# Patient Record
Sex: MALE | Race: WHITE | NOT HISPANIC OR LATINO | URBAN - METROPOLITAN AREA
[De-identification: names, ages, dates, MRNs, and addresses within clinical notes are randomized per-mention and may not be internally consistent; named-entity substitution may affect disease eponyms.]

---

## 2023-05-17 ENCOUNTER — OFFICE VISIT (OUTPATIENT)
Dept: PRIMARY CARE | Facility: CLINIC | Age: 58
End: 2023-05-17
Payer: COMMERCIAL

## 2023-05-17 VITALS
HEART RATE: 83 BPM | SYSTOLIC BLOOD PRESSURE: 110 MMHG | TEMPERATURE: 96.8 F | HEIGHT: 72 IN | OXYGEN SATURATION: 98 % | DIASTOLIC BLOOD PRESSURE: 83 MMHG | WEIGHT: 283 LBS | BODY MASS INDEX: 38.33 KG/M2

## 2023-05-17 DIAGNOSIS — B35.6 TINEA CRURIS: Primary | ICD-10-CM

## 2023-05-17 PROCEDURE — 1036F TOBACCO NON-USER: CPT | Performed by: FAMILY MEDICINE

## 2023-05-17 PROCEDURE — 99213 OFFICE O/P EST LOW 20 MIN: CPT | Performed by: FAMILY MEDICINE

## 2023-05-17 RX ORDER — ATORVASTATIN CALCIUM 20 MG/1
20 TABLET, FILM COATED ORAL DAILY
COMMUNITY
End: 2023-06-13 | Stop reason: SDUPTHER

## 2023-05-17 RX ORDER — FLUCONAZOLE 100 MG/1
100 TABLET ORAL DAILY
Qty: 15 TABLET | Refills: 0 | Status: SHIPPED | OUTPATIENT
Start: 2023-05-17 | End: 2023-06-01

## 2023-05-17 RX ORDER — FUROSEMIDE 20 MG/1
20 TABLET ORAL
COMMUNITY
Start: 2023-05-12 | End: 2023-12-14 | Stop reason: ALTCHOICE

## 2023-05-17 RX ORDER — DOXYLAMINE SUCCINATE 25 MG
TABLET ORAL 2 TIMES DAILY
Qty: 92 G | Refills: 0 | Status: SHIPPED | OUTPATIENT
Start: 2023-05-17 | End: 2023-06-16

## 2023-05-17 RX ORDER — METOPROLOL SUCCINATE 50 MG/1
50 TABLET, EXTENDED RELEASE ORAL
COMMUNITY
Start: 2023-05-12 | End: 2023-12-14 | Stop reason: ALTCHOICE

## 2023-05-17 RX ORDER — ASPIRIN 81 MG/1
TABLET ORAL
COMMUNITY
Start: 2017-07-07 | End: 2023-12-14 | Stop reason: ALTCHOICE

## 2023-05-17 RX ORDER — APIXABAN 5 MG/1
5 TABLET, FILM COATED ORAL 2 TIMES DAILY
COMMUNITY
Start: 2023-05-12

## 2023-05-17 ASSESSMENT — LIFESTYLE VARIABLES
SKIP TO QUESTIONS 9-10: 1
HOW MANY STANDARD DRINKS CONTAINING ALCOHOL DO YOU HAVE ON A TYPICAL DAY: 1 OR 2
HOW OFTEN DO YOU HAVE SIX OR MORE DRINKS ON ONE OCCASION: NEVER
AUDIT-C TOTAL SCORE: 1
HOW OFTEN DO YOU HAVE A DRINK CONTAINING ALCOHOL: MONTHLY OR LESS

## 2023-05-17 NOTE — PROGRESS NOTES
Subjective   Patient ID: Mu Horan is a 57 y.o. male who presents for Rash.    Assessment/Plan     Problem List Items Addressed This Visit    None  Visit Diagnoses       Tinea cruris    -  Primary    Relevant Medications    fluconazole (Diflucan) 100 mg tablet    miconazole (Micatin) 2 % cream          Tinea cruris consider above medication  Call us if symptoms are not improving  Patient agrees  HPI  57-year-old male complaining of skin rash with itching going under abdominal folds and groin region  Applying over-the-counter medication without much help no pain no discharge but itching is present  No Known Allergies    Current Outpatient Medications   Medication Sig Dispense Refill    aspirin 81 mg EC tablet Take by mouth.      atorvastatin (Lipitor) 20 mg tablet Take 1 tablet (20 mg) by mouth once daily.      Eliquis 5 mg tablet Take 1 tablet (5 mg) by mouth 2 times a day.      fluconazole (Diflucan) 100 mg tablet Take 1 tablet (100 mg) by mouth once daily for 15 days. 15 tablet 0    Lasix 20 mg tablet 1 tablet (20 mg).      miconazole (Micatin) 2 % cream Apply topically 2 times a day. 92 g 0    Toprol XL 50 mg 24 hr tablet 1 tablet (50 mg).       No current facility-administered medications for this visit.       Objective   Visit Vitals  /83 (BP Location: Left arm, Patient Position: Sitting)   Pulse 83   Temp 36 °C (96.8 °F)   Ht 1.829 m (6')   Wt 128 kg (283 lb)   SpO2 98%   BMI 38.38 kg/m²   Smoking Status Never   BSA 2.55 m²     Physical Exam  Constitutional:       General: He is not in acute distress.     Appearance: Normal appearance.   HENT:      Head: Normocephalic and atraumatic.      Nose: Nose normal.   Eyes:      Extraocular Movements: Extraocular movements intact.      Conjunctiva/sclera: Conjunctivae normal.   Cardiovascular:      Rate and Rhythm: Normal rate and regular rhythm.   Pulmonary:      Effort: Pulmonary effort is normal.      Breath sounds: Normal breath sounds.   Skin:      General: Skin is warm.   Neurological:      Mental Status: He is alert and oriented to person, place, and time.   Psychiatric:         Mood and Affect: Mood normal.         Behavior: Behavior normal.     Macular rash present in abdominal folds without tenderness with scaly skin's  Bilateral groin similar rash present  Immunization History   Administered Date(s) Administered    Nelia SARS-CoV-2 Vaccination 03/20/2021    Moderna SARS-CoV-2 Vaccination 12/21/2021       Review of Systems    No visits with results within 4 Month(s) from this visit.   Latest known visit with results is:   Legacy Encounter on 07/13/2021   Component Date Value Ref Range Status    TSH 07/13/2021 2.68  0.44 - 3.98 mIU/L Final    Color, Urine 07/13/2021 BERNARD  STRAW,YELLOW Final    Appearance, Urine 07/13/2021 HAZY  CLEAR Final    Specific Gravity, Urine 07/13/2021 1.020  1.005 - 1.035 Final    pH, Urine 07/13/2021 5.0  5.0 - 8.0 Final    Protein, Urine 07/13/2021 NEGATIVE  NEGATIVE mg/dL Final    Glucose, Urine 07/13/2021 NEGATIVE  NEGATIVE mg/dL Final    Blood, Urine 07/13/2021 NEGATIVE  NEGATIVE Final    Ketones, Urine 07/13/2021 5 (TRACE) (A)  NEGATIVE mg/dL Final    Bilirubin, Urine 07/13/2021 NEGATIVE  NEGATIVE Final    Urobilinogen, Urine 07/13/2021 2.0 (H)  0.0 - 1.9 mg/dL Final    Nitrite, Urine 07/13/2021 NEGATIVE  NEGATIVE Final    Leukocyte Esterase, Urine 07/13/2021 NEGATIVE  NEGATIVE Final    Glucose 07/13/2021 111 (H)  74 - 99 mg/dL Final    Sodium 07/13/2021 139  136 - 145 mmol/L Final    Potassium 07/13/2021 4.1  3.5 - 5.3 mmol/L Final    Chloride 07/13/2021 103  98 - 107 mmol/L Final    Bicarbonate 07/13/2021 26  21 - 32 mmol/L Final    Anion Gap 07/13/2021 14  10 - 20 mmol/L Final    Urea Nitrogen 07/13/2021 15  6 - 23 mg/dL Final    Creatinine 07/13/2021 0.70  0.50 - 1.30 mg/dL Final    GLOMERULAR FILTRATION RATE-NON AFR* 07/13/2021 >60  >60 mL/min/1.73m2 Final    GLOMERULAR FILTRATION RATE-* 07/13/2021 >60  >60  mL/min/1.73m2 Final    Calcium 07/13/2021 9.6  8.6 - 10.6 mg/dL Final    Albumin 07/13/2021 4.2  3.4 - 5.0 g/dL Final    Alkaline Phosphatase 07/13/2021 66  33 - 120 U/L Final    Total Protein 07/13/2021 6.8  6.4 - 8.2 g/dL Final    AST 07/13/2021 14  9 - 39 U/L Final    Total Bilirubin 07/13/2021 0.9  0.0 - 1.2 mg/dL Final    ALT (SGPT) 07/13/2021 21  10 - 52 U/L Final    Cholesterol 07/13/2021 169  0 - 199 mg/dL Final    HDL 07/13/2021 40.5  mg/dL Final    Cholesterol/HDL Ratio 07/13/2021 4.2   Final    LDL 07/13/2021 116 (H)  0 - 99 mg/dL Final    VLDL 07/13/2021 13  0 - 40 mg/dL Final    Triglycerides 07/13/2021 63  0 - 149 mg/dL Final    WBC 07/13/2021 5.9  4.4 - 11.3 x10E9/L Final    nRBC 07/13/2021 0.0  0.0 - 0.0 /100 WBC Final    RBC 07/13/2021 4.77  4.50 - 5.90 x10E12/L Final    Hemoglobin 07/13/2021 14.4  13.5 - 17.5 g/dL Final    Hematocrit 07/13/2021 45.5  41.0 - 52.0 % Final    MCV 07/13/2021 95  80 - 100 fL Final    MCHC 07/13/2021 31.6 (L)  32.0 - 36.0 g/dL Final    Platelets 07/13/2021 204  150 - 450 x10E9/L Final    RDW 07/13/2021 14.6 (H)  11.5 - 14.5 % Final    ALBUMIN (MG/L) IN URINE 07/13/2021 12.0  Not Established mg/L Final    Albumin/Creatine Ratio 07/13/2021 6.3  0.0 - 30.0 ug/mg crt Final    Creatinine, Urine 07/13/2021 190.0  20.0 - 370.0 mg/dL Final       Radiology: Reviewed imaging in powerchart.  No results found.    No family history on file.  Social History     Socioeconomic History    Marital status:      Spouse name: None    Number of children: None    Years of education: None    Highest education level: None   Occupational History    None   Tobacco Use    Smoking status: Never    Smokeless tobacco: Never   Vaping Use    Vaping status: None   Substance and Sexual Activity    Alcohol use: Not Currently    Drug use: Never    Sexual activity: None   Other Topics Concern    None   Social History Narrative    None     Social Determinants of Health     Financial Resource Strain:  Not on file   Food Insecurity: Not on file   Transportation Needs: Not on file   Physical Activity: Not on file   Stress: Not on file   Social Connections: Not on file   Intimate Partner Violence: Not on file   Housing Stability: Not on file     Past Medical History:   Diagnosis Date    Lumbago with sciatica, right side 11/20/2015    Right-sided low back pain with right-sided sciatica    Personal history of other diseases of the musculoskeletal system and connective tissue 11/16/2015    History of backache    Personal history of other specified conditions     History of vertigo     Past Surgical History:   Procedure Laterality Date    KNEE SURGERY  11/20/2015    Knee Surgery Right       Charting was completed using voice recognition technology and may include unintended errors.

## 2023-06-13 DIAGNOSIS — E78.5 HYPERLIPIDEMIA, UNSPECIFIED HYPERLIPIDEMIA TYPE: ICD-10-CM

## 2023-06-14 RX ORDER — ATORVASTATIN CALCIUM 20 MG/1
20 TABLET, FILM COATED ORAL DAILY
Qty: 90 TABLET | Refills: 2 | Status: SHIPPED | OUTPATIENT
Start: 2023-06-14 | End: 2024-03-12 | Stop reason: SDUPTHER

## 2023-09-27 PROBLEM — E66.1 CLASS 3 DRUG-INDUCED OBESITY WITH BODY MASS INDEX (BMI) OF 40.0 TO 44.9 IN ADULT (MULTI): Status: ACTIVE | Noted: 2023-09-27

## 2023-09-27 PROBLEM — M79.604 PAIN OF RIGHT LOWER EXTREMITY: Status: ACTIVE | Noted: 2023-09-27

## 2023-09-27 PROBLEM — B35.4 TINEA CORPORIS: Status: ACTIVE | Noted: 2023-09-27

## 2023-09-27 PROBLEM — R19.7 ACUTE DIARRHEA: Status: ACTIVE | Noted: 2023-09-27

## 2023-09-27 PROBLEM — M25.569 KNEE PAIN: Status: ACTIVE | Noted: 2023-09-27

## 2023-09-27 PROBLEM — E66.813 CLASS 3 DRUG-INDUCED OBESITY WITH BODY MASS INDEX (BMI) OF 40.0 TO 44.9 IN ADULT: Status: ACTIVE | Noted: 2023-09-27

## 2023-09-27 PROBLEM — I82.409 DEEP VEIN THROMBOSIS (MULTI): Status: ACTIVE | Noted: 2023-09-27

## 2023-09-27 PROBLEM — M17.11 LOCALIZED OSTEOARTHRITIS OF RIGHT KNEE: Status: ACTIVE | Noted: 2023-09-27

## 2023-09-27 PROBLEM — I10 BENIGN ESSENTIAL HYPERTENSION: Status: ACTIVE | Noted: 2023-09-27

## 2023-09-27 PROBLEM — J06.9 ACUTE UPPER RESPIRATORY INFECTION: Status: ACTIVE | Noted: 2023-09-27

## 2023-09-27 PROBLEM — I86.8 VARICOSE VEINS OF OTHER SPECIFIED SITES: Status: ACTIVE | Noted: 2023-09-27

## 2023-09-27 PROBLEM — R21 RASH: Status: ACTIVE | Noted: 2023-09-27

## 2023-09-27 PROBLEM — N20.0 CALCIUM NEPHROLITHIASIS: Status: ACTIVE | Noted: 2023-09-27

## 2023-09-27 PROBLEM — E55.9 VITAMIN D DEFICIENCY: Status: ACTIVE | Noted: 2023-09-27

## 2023-09-27 PROBLEM — E78.5 HYPERLIPIDEMIA: Status: ACTIVE | Noted: 2023-09-27

## 2023-09-27 PROBLEM — E11.9 CONTROLLED DIABETES MELLITUS (MULTI): Status: ACTIVE | Noted: 2023-09-27

## 2023-09-27 RX ORDER — METOPROLOL TARTRATE 25 MG/1
1 TABLET, FILM COATED ORAL 2 TIMES DAILY
COMMUNITY
Start: 2023-06-21

## 2023-09-27 RX ORDER — FLUCONAZOLE 100 MG/1
1 TABLET ORAL DAILY
COMMUNITY
Start: 2022-01-11 | End: 2023-12-14 | Stop reason: ALTCHOICE

## 2023-09-27 RX ORDER — AMIODARONE HYDROCHLORIDE 200 MG/1
200 TABLET ORAL DAILY
COMMUNITY
Start: 2023-05-30 | End: 2023-12-14 | Stop reason: ALTCHOICE

## 2023-09-27 RX ORDER — POTASSIUM CHLORIDE 20 MEQ/1
20 TABLET, EXTENDED RELEASE ORAL DAILY
COMMUNITY
Start: 2023-05-30 | End: 2023-12-14 | Stop reason: ALTCHOICE

## 2023-11-07 ENCOUNTER — APPOINTMENT (OUTPATIENT)
Dept: CARDIAC SURGERY | Facility: CLINIC | Age: 58
End: 2023-11-07
Payer: COMMERCIAL

## 2023-12-14 ENCOUNTER — OFFICE VISIT (OUTPATIENT)
Dept: PRIMARY CARE | Facility: CLINIC | Age: 58
End: 2023-12-14
Payer: COMMERCIAL

## 2023-12-14 VITALS
HEART RATE: 98 BPM | TEMPERATURE: 97.2 F | OXYGEN SATURATION: 99 % | SYSTOLIC BLOOD PRESSURE: 112 MMHG | HEIGHT: 72 IN | DIASTOLIC BLOOD PRESSURE: 70 MMHG | BODY MASS INDEX: 39.82 KG/M2 | WEIGHT: 294 LBS

## 2023-12-14 DIAGNOSIS — Z00.00 VISIT FOR PREVENTIVE HEALTH EXAMINATION: Primary | ICD-10-CM

## 2023-12-14 DIAGNOSIS — Z12.11 SCREENING FOR MALIGNANT NEOPLASM OF COLON: ICD-10-CM

## 2023-12-14 DIAGNOSIS — E11.9 CONTROLLED TYPE 2 DIABETES MELLITUS WITHOUT COMPLICATION, WITHOUT LONG-TERM CURRENT USE OF INSULIN (MULTI): ICD-10-CM

## 2023-12-14 DIAGNOSIS — I82.409 DEEP VEIN THROMBOSIS (DVT) OF LOWER EXTREMITY, UNSPECIFIED CHRONICITY, UNSPECIFIED LATERALITY, UNSPECIFIED VEIN (MULTI): ICD-10-CM

## 2023-12-14 DIAGNOSIS — I48.0 PAROXYSMAL ATRIAL FIBRILLATION (MULTI): ICD-10-CM

## 2023-12-14 PROBLEM — E66.813 CLASS 3 DRUG-INDUCED OBESITY WITH BODY MASS INDEX (BMI) OF 40.0 TO 44.9 IN ADULT: Status: RESOLVED | Noted: 2023-09-27 | Resolved: 2023-12-14

## 2023-12-14 PROBLEM — M79.604 PAIN OF RIGHT LOWER EXTREMITY: Status: RESOLVED | Noted: 2023-09-27 | Resolved: 2023-12-14

## 2023-12-14 PROBLEM — E66.1 CLASS 3 DRUG-INDUCED OBESITY WITH BODY MASS INDEX (BMI) OF 40.0 TO 44.9 IN ADULT (MULTI): Status: RESOLVED | Noted: 2023-09-27 | Resolved: 2023-12-14

## 2023-12-14 PROBLEM — R21 RASH: Status: RESOLVED | Noted: 2023-09-27 | Resolved: 2023-12-14

## 2023-12-14 PROBLEM — R19.7 ACUTE DIARRHEA: Status: RESOLVED | Noted: 2023-09-27 | Resolved: 2023-12-14

## 2023-12-14 PROBLEM — B35.4 TINEA CORPORIS: Status: RESOLVED | Noted: 2023-09-27 | Resolved: 2023-12-14

## 2023-12-14 PROBLEM — M25.569 KNEE PAIN: Status: RESOLVED | Noted: 2023-09-27 | Resolved: 2023-12-14

## 2023-12-14 PROCEDURE — 3078F DIAST BP <80 MM HG: CPT | Performed by: FAMILY MEDICINE

## 2023-12-14 PROCEDURE — 1036F TOBACCO NON-USER: CPT | Performed by: FAMILY MEDICINE

## 2023-12-14 PROCEDURE — 3074F SYST BP LT 130 MM HG: CPT | Performed by: FAMILY MEDICINE

## 2023-12-14 PROCEDURE — 99396 PREV VISIT EST AGE 40-64: CPT | Performed by: FAMILY MEDICINE

## 2023-12-14 NOTE — PROGRESS NOTES
Subjective   Patient ID: Mu Horan is a 58 y.o. male who presents for Annual Exam.    Assessment/Plan     Problem List Items Addressed This Visit       Deep vein thrombosis (CMS/HCC)    Controlled diabetes mellitus (CMS/HCC)    Relevant Orders    Prostate Specific Antigen, Screen    TSH with reflex to Free T4 if abnormal    Lipid Panel    Comprehensive Metabolic Panel    CBC    Urinalysis with Reflex Microscopic    Hemoglobin A1C    Albumin , Urine Random    Paroxysmal atrial fibrillation (CMS/HCC)    Visit for preventive health examination - Primary    Relevant Orders    Prostate Specific Antigen, Screen    TSH with reflex to Free T4 if abnormal    Lipid Panel    Comprehensive Metabolic Panel    CBC    Urinalysis with Reflex Microscopic    Hemoglobin A1C    Albumin , Urine Random     Other Visit Diagnoses       Screening for malignant neoplasm of colon        Relevant Orders    Cologuard® colon cancer screening        Lab work today  considerable medication  Discussed about diet exercise weight loss  Received Pneumovax  Discussed about checking blood sugar  Advised to follow-up with ophthalmology   cologuard - ordered   flu - done  rsv  Consider cologuard negative February 2019  Advised to get shingles vaccine       HPI    58-year-old male here for physical and follow-up on        Diabetes hemoglobin A1c was 5.9 previously was on metformin was discontinued Rybelsus was discontinued  Hypertension currently not taking blood pressure medication  Hyperlipidemia  BMI 39 <--31 improved from 57  Knee osteoarthritis status post bilateral total knee replacement   Paroxysmal A-fib on Eliquis  thoracic ascending aortic aneurysm s/p repair  Presence of prosthetic heart valve  Bilateral lower extremity varicose vein present     Taking blood pressure medication   No hypoglycemia  Patient says he started watching his diet and joined gym doing swimming  Patient has lost more than 125 pounds with diet exercise since last 1  year  No smoking alcohol or drug use    Patient gained few pounds back            No Known Allergies    Current Outpatient Medications   Medication Sig Dispense Refill    atorvastatin (Lipitor) 20 mg tablet Take 1 tablet (20 mg) by mouth once daily. 90 tablet 2    Eliquis 5 mg tablet Take 1 tablet (5 mg) by mouth 2 times a day.      metoprolol tartrate (Lopressor) 25 mg tablet Take 1 tablet (25 mg) by mouth 2 times a day.       No current facility-administered medications for this visit.       Objective   Visit Vitals  /70 (BP Location: Left arm, Patient Position: Sitting)   Pulse 98   Temp 36.2 °C (97.2 °F)   Ht 1.829 m (6')   Wt 133 kg (294 lb)   SpO2 99%   BMI 39.87 kg/m²   Smoking Status Never   BSA 2.6 m²     Physical Exam  Cardiovascular:      Pulses:           Dorsalis pedis pulses are 2+ on the right side and 2+ on the left side.        Posterior tibial pulses are 2+ on the right side and 2+ on the left side.   Musculoskeletal:      Right foot: No deformity.      Left foot: No deformity.   Feet:      Right foot:      Protective Sensation: 5 sites tested.        Skin integrity: Skin integrity normal.      Toenail Condition: Right toenails are normal.      Left foot:      Protective Sensation: 5 sites tested.        Skin integrity: Skin integrity normal.      Toenail Condition: Left toenails are normal.         Constitutional   General appearance: Alert and in no acute distress.   Head and Face   Head and face: Normal.     Palpation of the face and sinuses: Normal.    Eyes   Inspection of eyes: Sclera and conjunctiva were normal.    Pupil exam: Pupils were equal in size. Extraocular movements were intact.   Ears, Nose, Mouth, and Throat   Ears: Auricles: Normal.    Otoscopic examination: Tympanic membranes: Normal with no congestion and no discharge. Otic Canals: Normal without tenderness, congestion or discharge.    Hearing: Normal.     Nasal mucosa, septum, and turbinates: Normal without edema or  erythema.    Lips, teeth, and gums: Normal.    Oropharynx: Normal with moist mucus membranes, no congestion. Tonsils: Normal no follicles.   Neck   Neck Exam: Appearance of the neck was normal. No neck masses observed.    Thyroid exam: Not enlarged and no palpable thyroid nodules.   Pulmonary   Respiratory assessment: No respiratory distress, normal respiratory rhythm and effort.    Auscultation of Lungs: Clear bilateral breath sounds.   Cardiovascular   Auscultation of heart: Apical pulse normal, heart rate and rhythm normal, normal S1 and S2, no murmurs and no pericardial rub.    Carotid arteries: Pulses normal with no bruits.    Exam for edema: No peripheral edema.   Chest   Chest: Normal A_P diameter, no pulsation, no intercostal withdrawing. Trachea central.   Abdomen   Abdominal Exam: No bruits, normal bowel sounds, soft, non-tender, no abdominal mass palpated.    Liver and Spleen exam: No hepato-splenomegaly.    Examination for hernias: Normal.    Lymphatic   Palpation of lymph nodes in neck: No cervical lymphadenopathy.   Musculoskeletal   Examination of gait: Normal.    Inspection of digits and nails: No clubbing or cyanosis of the fingernails.    Inspection/palpation of joints, bones and muscles: No joint swelling. Normal movement of all extremities.    Range of Motion: Normal movement of all extremities.   Skin   Skin inspection: Normal skin color and pigmentation, normal skin turgor and no visible rash.   Neurologic   Cranial nerves: Nerves 2-12 were intact, no focal neuro defects.    Reflexes: Normal.     Sensation: Normal.     Coordination: Normal.    Psychiatric   Judgment and insight: Intact.    Orientation: Oriented to person, place, and time.    Recent and remote memory: Normal.     Mood and affect: Normal.     Genitourinary Declined.    Immunization History   Administered Date(s) Administered    Flu vaccine (IIV4), preservative free *Check age/dose* 02/01/2016, 01/08/2019, 10/28/2020, 01/11/2022     Influenza, Unspecified 11/08/2023    Influenza, injectable, quadrivalent 10/30/2017    Influenza, seasonal, injectable, preservative free 02/01/2016    Nelia SARS-CoV-2 Vaccination 03/29/2021    Moderna SARS-CoV-2 Vaccination 12/21/2021    Pneumococcal polysaccharide vaccine, 23-valent, age 2 years and older (PNEUMOVAX 23) 10/30/2020       Review of Systems    No visits with results within 4 Month(s) from this visit.   Latest known visit with results is:   Legacy Encounter on 07/13/2021   Component Date Value Ref Range Status    TSH 07/13/2021 2.68  0.44 - 3.98 mIU/L Final    Color, Urine 07/13/2021 BERNARD  STRAW,YELLOW Final    Appearance, Urine 07/13/2021 HAZY  CLEAR Final    Specific Gravity, Urine 07/13/2021 1.020  1.005 - 1.035 Final    pH, Urine 07/13/2021 5.0  5.0 - 8.0 Final    Protein, Urine 07/13/2021 NEGATIVE  NEGATIVE mg/dL Final    Glucose, Urine 07/13/2021 NEGATIVE  NEGATIVE mg/dL Final    Blood, Urine 07/13/2021 NEGATIVE  NEGATIVE Final    Ketones, Urine 07/13/2021 5 (TRACE) (A)  NEGATIVE mg/dL Final    Bilirubin, Urine 07/13/2021 NEGATIVE  NEGATIVE Final    Urobilinogen, Urine 07/13/2021 2.0 (H)  0.0 - 1.9 mg/dL Final    Nitrite, Urine 07/13/2021 NEGATIVE  NEGATIVE Final    Leukocyte Esterase, Urine 07/13/2021 NEGATIVE  NEGATIVE Final    Glucose 07/13/2021 111 (H)  74 - 99 mg/dL Final    Sodium 07/13/2021 139  136 - 145 mmol/L Final    Potassium 07/13/2021 4.1  3.5 - 5.3 mmol/L Final    Chloride 07/13/2021 103  98 - 107 mmol/L Final    Bicarbonate 07/13/2021 26  21 - 32 mmol/L Final    Anion Gap 07/13/2021 14  10 - 20 mmol/L Final    Urea Nitrogen 07/13/2021 15  6 - 23 mg/dL Final    Creatinine 07/13/2021 0.70  0.50 - 1.30 mg/dL Final    GLOMERULAR FILTRATION RATE-NON AFR* 07/13/2021 >60  >60 mL/min/1.73m2 Final    GLOMERULAR FILTRATION RATE-* 07/13/2021 >60  >60 mL/min/1.73m2 Final    Calcium 07/13/2021 9.6  8.6 - 10.6 mg/dL Final    Albumin 07/13/2021 4.2  3.4 - 5.0 g/dL Final     Alkaline Phosphatase 07/13/2021 66  33 - 120 U/L Final    Total Protein 07/13/2021 6.8  6.4 - 8.2 g/dL Final    AST 07/13/2021 14  9 - 39 U/L Final    Total Bilirubin 07/13/2021 0.9  0.0 - 1.2 mg/dL Final    ALT (SGPT) 07/13/2021 21  10 - 52 U/L Final    Cholesterol 07/13/2021 169  0 - 199 mg/dL Final    HDL 07/13/2021 40.5  mg/dL Final    Cholesterol/HDL Ratio 07/13/2021 4.2   Final    LDL 07/13/2021 116 (H)  0 - 99 mg/dL Final    VLDL 07/13/2021 13  0 - 40 mg/dL Final    Triglycerides 07/13/2021 63  0 - 149 mg/dL Final    WBC 07/13/2021 5.9  4.4 - 11.3 x10E9/L Final    nRBC 07/13/2021 0.0  0.0 - 0.0 /100 WBC Final    RBC 07/13/2021 4.77  4.50 - 5.90 x10E12/L Final    Hemoglobin 07/13/2021 14.4  13.5 - 17.5 g/dL Final    Hematocrit 07/13/2021 45.5  41.0 - 52.0 % Final    MCV 07/13/2021 95  80 - 100 fL Final    MCHC 07/13/2021 31.6 (L)  32.0 - 36.0 g/dL Final    Platelets 07/13/2021 204  150 - 450 x10E9/L Final    RDW 07/13/2021 14.6 (H)  11.5 - 14.5 % Final    ALBUMIN (MG/L) IN URINE 07/13/2021 12.0  Not Established mg/L Final    Albumin/Creatine Ratio 07/13/2021 6.3  0.0 - 30.0 ug/mg crt Final    Creatinine, Urine 07/13/2021 190.0  20.0 - 370.0 mg/dL Final       Radiology: Reviewed imaging in powerchart.  No results found.    No family history on file.  Social History     Socioeconomic History    Marital status:      Spouse name: None    Number of children: None    Years of education: None    Highest education level: None   Occupational History    None   Tobacco Use    Smoking status: Never    Smokeless tobacco: Never   Substance and Sexual Activity    Alcohol use: Not Currently    Drug use: Never    Sexual activity: None   Other Topics Concern    None   Social History Narrative    None     Social Determinants of Health     Financial Resource Strain: Not on file   Food Insecurity: Not on file   Transportation Needs: Not on file   Physical Activity: Not on file   Stress: Not on file   Social Connections: Not  on file   Intimate Partner Violence: Not on file   Housing Stability: Not on file     Past Medical History:   Diagnosis Date    Lumbago with sciatica, right side 11/20/2015    Right-sided low back pain with right-sided sciatica    Personal history of other diseases of the musculoskeletal system and connective tissue 11/16/2015    History of backache    Personal history of other specified conditions     History of vertigo     Past Surgical History:   Procedure Laterality Date    KNEE SURGERY  11/20/2015    Knee Surgery Right       Charting was completed using voice recognition technology and may include unintended errors.

## 2023-12-19 ENCOUNTER — LAB (OUTPATIENT)
Dept: LAB | Facility: LAB | Age: 58
End: 2023-12-19
Payer: COMMERCIAL

## 2023-12-19 DIAGNOSIS — Z00.00 VISIT FOR PREVENTIVE HEALTH EXAMINATION: ICD-10-CM

## 2023-12-19 DIAGNOSIS — E11.9 CONTROLLED TYPE 2 DIABETES MELLITUS WITHOUT COMPLICATION, WITHOUT LONG-TERM CURRENT USE OF INSULIN (MULTI): ICD-10-CM

## 2023-12-19 LAB
ALBUMIN SERPL BCP-MCNC: 4.4 G/DL (ref 3.4–5)
ALP SERPL-CCNC: 88 U/L (ref 33–136)
ALT SERPL W P-5'-P-CCNC: 23 U/L (ref 10–52)
ANION GAP SERPL CALC-SCNC: 10 MMOL/L (ref 10–20)
APPEARANCE UR: ABNORMAL
AST SERPL W P-5'-P-CCNC: 20 U/L (ref 9–39)
BILIRUB SERPL-MCNC: 0.9 MG/DL (ref 0–1.2)
BILIRUB UR STRIP.AUTO-MCNC: NEGATIVE MG/DL
BUN SERPL-MCNC: 20 MG/DL (ref 6–23)
CALCIUM SERPL-MCNC: 9.5 MG/DL (ref 8.6–10.6)
CHLORIDE SERPL-SCNC: 103 MMOL/L (ref 98–107)
CHOLEST SERPL-MCNC: 123 MG/DL (ref 0–199)
CHOLESTEROL/HDL RATIO: 2.6
CO2 SERPL-SCNC: 31 MMOL/L (ref 21–32)
COLOR UR: YELLOW
CREAT SERPL-MCNC: 0.76 MG/DL (ref 0.5–1.3)
CREAT UR-MCNC: 93.6 MG/DL (ref 20–370)
ERYTHROCYTE [DISTWIDTH] IN BLOOD BY AUTOMATED COUNT: 14.4 % (ref 11.5–14.5)
EST. AVERAGE GLUCOSE BLD GHB EST-MCNC: 131 MG/DL
GFR SERPL CREATININE-BSD FRML MDRD: >90 ML/MIN/1.73M*2
GLUCOSE SERPL-MCNC: 106 MG/DL (ref 74–99)
GLUCOSE UR STRIP.AUTO-MCNC: NEGATIVE MG/DL
HBA1C MFR BLD: 6.2 %
HCT VFR BLD AUTO: 41.2 % (ref 41–52)
HDLC SERPL-MCNC: 47 MG/DL
HGB BLD-MCNC: 12.8 G/DL (ref 13.5–17.5)
KETONES UR STRIP.AUTO-MCNC: NEGATIVE MG/DL
LDLC SERPL CALC-MCNC: 65 MG/DL
LEUKOCYTE ESTERASE UR QL STRIP.AUTO: NEGATIVE
MCH RBC QN AUTO: 28.2 PG (ref 26–34)
MCHC RBC AUTO-ENTMCNC: 31.1 G/DL (ref 32–36)
MCV RBC AUTO: 91 FL (ref 80–100)
MICROALBUMIN UR-MCNC: 21.5 MG/L
MICROALBUMIN/CREAT UR: 23 UG/MG CREAT
NITRITE UR QL STRIP.AUTO: NEGATIVE
NON HDL CHOLESTEROL: 76 MG/DL (ref 0–149)
NRBC BLD-RTO: 0 /100 WBCS (ref 0–0)
PH UR STRIP.AUTO: 5 [PH]
PLATELET # BLD AUTO: 243 X10*3/UL (ref 150–450)
POTASSIUM SERPL-SCNC: 4.8 MMOL/L (ref 3.5–5.3)
PROT SERPL-MCNC: 7.5 G/DL (ref 6.4–8.2)
PROT UR STRIP.AUTO-MCNC: NEGATIVE MG/DL
PSA SERPL-MCNC: 0.42 NG/ML
RBC # BLD AUTO: 4.54 X10*6/UL (ref 4.5–5.9)
RBC # UR STRIP.AUTO: NEGATIVE /UL
SODIUM SERPL-SCNC: 139 MMOL/L (ref 136–145)
SP GR UR STRIP.AUTO: 1.01
T4 FREE SERPL-MCNC: 0.94 NG/DL (ref 0.78–1.48)
TRIGL SERPL-MCNC: 57 MG/DL (ref 0–149)
TSH SERPL-ACNC: 4.31 MIU/L (ref 0.44–3.98)
UROBILINOGEN UR STRIP.AUTO-MCNC: <2 MG/DL
VLDL: 11 MG/DL (ref 0–40)
WBC # BLD AUTO: 5.5 X10*3/UL (ref 4.4–11.3)

## 2023-12-19 PROCEDURE — 85027 COMPLETE CBC AUTOMATED: CPT

## 2023-12-19 PROCEDURE — 84443 ASSAY THYROID STIM HORMONE: CPT

## 2023-12-19 PROCEDURE — 80053 COMPREHEN METABOLIC PANEL: CPT

## 2023-12-19 PROCEDURE — 81003 URINALYSIS AUTO W/O SCOPE: CPT

## 2023-12-19 PROCEDURE — 36415 COLL VENOUS BLD VENIPUNCTURE: CPT

## 2023-12-19 PROCEDURE — 83036 HEMOGLOBIN GLYCOSYLATED A1C: CPT

## 2023-12-19 PROCEDURE — 82043 UR ALBUMIN QUANTITATIVE: CPT

## 2023-12-19 PROCEDURE — 84153 ASSAY OF PSA TOTAL: CPT

## 2023-12-19 PROCEDURE — 80061 LIPID PANEL: CPT

## 2023-12-19 PROCEDURE — 84439 ASSAY OF FREE THYROXINE: CPT

## 2023-12-19 PROCEDURE — 82570 ASSAY OF URINE CREATININE: CPT

## 2023-12-28 LAB — NONINV COLON CA DNA+OCC BLD SCRN STL QL: NEGATIVE

## 2024-03-12 ENCOUNTER — TELEPHONE (OUTPATIENT)
Dept: PRIMARY CARE | Facility: CLINIC | Age: 59
End: 2024-03-12
Payer: COMMERCIAL

## 2024-03-12 DIAGNOSIS — E78.5 HYPERLIPIDEMIA, UNSPECIFIED HYPERLIPIDEMIA TYPE: ICD-10-CM

## 2024-03-12 RX ORDER — ATORVASTATIN CALCIUM 20 MG/1
20 TABLET, FILM COATED ORAL DAILY
Qty: 90 TABLET | Refills: 2 | Status: SHIPPED | OUTPATIENT
Start: 2024-03-12 | End: 2025-03-12

## 2024-07-01 ENCOUNTER — TELEPHONE (OUTPATIENT)
Dept: PRIMARY CARE | Facility: CLINIC | Age: 59
End: 2024-07-01
Payer: COMMERCIAL

## 2024-07-01 NOTE — TELEPHONE ENCOUNTER
Pt states they have a rash on their lower back/buttox and in the past he had an rx prescribed to help with a similar issue and it helped. Pt is requesting a new prescription for an anti-itch cream. Thank you

## 2024-07-02 DIAGNOSIS — B35.4 TINEA CORPORIS: Primary | ICD-10-CM

## 2024-07-02 RX ORDER — DOXYLAMINE SUCCINATE 25 MG
TABLET ORAL 2 TIMES DAILY
Qty: 42.5 G | Refills: 1 | Status: SHIPPED | OUTPATIENT
Start: 2024-07-02

## 2025-04-11 ENCOUNTER — PATIENT OUTREACH (OUTPATIENT)
Dept: CARE COORDINATION | Age: 60
End: 2025-04-11
Payer: COMMERCIAL

## 2025-04-19 ENCOUNTER — DOCUMENTATION (OUTPATIENT)
Dept: CARE COORDINATION | Age: 60
End: 2025-04-19
Payer: COMMERCIAL

## 2025-04-23 ENCOUNTER — PATIENT OUTREACH (OUTPATIENT)
Dept: CARE COORDINATION | Age: 60
End: 2025-04-23
Payer: COMMERCIAL

## 2025-11-05 ENCOUNTER — APPOINTMENT (OUTPATIENT)
Dept: PRIMARY CARE | Facility: CLINIC | Age: 60
End: 2025-11-05
Payer: COMMERCIAL